# Patient Record
Sex: FEMALE | Race: WHITE | NOT HISPANIC OR LATINO | Employment: FULL TIME | URBAN - METROPOLITAN AREA
[De-identification: names, ages, dates, MRNs, and addresses within clinical notes are randomized per-mention and may not be internally consistent; named-entity substitution may affect disease eponyms.]

---

## 2019-08-12 ENCOUNTER — APPOINTMENT (EMERGENCY)
Dept: RADIOLOGY | Facility: HOSPITAL | Age: 54
End: 2019-08-12
Payer: OTHER MISCELLANEOUS

## 2019-08-12 ENCOUNTER — HOSPITAL ENCOUNTER (EMERGENCY)
Facility: HOSPITAL | Age: 54
Discharge: HOME | End: 2019-08-12
Attending: EMERGENCY MEDICINE | Admitting: EMERGENCY MEDICINE
Payer: OTHER MISCELLANEOUS

## 2019-08-12 VITALS
SYSTOLIC BLOOD PRESSURE: 116 MMHG | OXYGEN SATURATION: 98 % | WEIGHT: 140 LBS | HEART RATE: 76 BPM | RESPIRATION RATE: 16 BRPM | TEMPERATURE: 98 F | DIASTOLIC BLOOD PRESSURE: 71 MMHG

## 2019-08-12 DIAGNOSIS — S82.841A ANKLE FRACTURE, BIMALLEOLAR, CLOSED, RIGHT, INITIAL ENCOUNTER: Primary | ICD-10-CM

## 2019-08-12 PROCEDURE — 72220 X-RAY EXAM SACRUM TAILBONE: CPT

## 2019-08-12 PROCEDURE — 73610 X-RAY EXAM OF ANKLE: CPT

## 2019-08-12 PROCEDURE — 96375 TX/PRO/DX INJ NEW DRUG ADDON: CPT

## 2019-08-12 PROCEDURE — 96376 TX/PRO/DX INJ SAME DRUG ADON: CPT

## 2019-08-12 PROCEDURE — 96361 HYDRATE IV INFUSION ADD-ON: CPT

## 2019-08-12 PROCEDURE — 99285 EMERGENCY DEPT VISIT HI MDM: CPT | Performed by: EMERGENCY MEDICINE

## 2019-08-12 PROCEDURE — 73590 X-RAY EXAM OF LOWER LEG: CPT

## 2019-08-12 PROCEDURE — NS001 PR NO SIGNATURE OR ATTESTATION: Performed by: ORTHOPAEDIC SURGERY

## 2019-08-12 PROCEDURE — 27810 TREATMENT OF ANKLE FRACTURE: CPT | Performed by: EMERGENCY MEDICINE

## 2019-08-12 PROCEDURE — 99284 EMERGENCY DEPT VISIT MOD MDM: CPT

## 2019-08-12 PROCEDURE — 73600 X-RAY EXAM OF ANKLE: CPT

## 2019-08-12 PROCEDURE — 96374 THER/PROPH/DIAG INJ IV PUSH: CPT

## 2019-08-12 RX ORDER — KETAMINE HCL IN NACL, ISO-OSM 100MG/10ML
2 SYRINGE (ML) INJECTION ONCE
Status: COMPLETED | OUTPATIENT
Start: 2019-08-12 | End: 2019-08-12

## 2019-08-12 RX ORDER — LIDOCAINE HYDROCHLORIDE 10 MG/ML
30 INJECTION, SOLUTION EPIDURAL; INFILTRATION; INTRACAUDAL; PERINEURAL ONCE
Status: COMPLETED | OUTPATIENT
Start: 2019-08-12 | End: 2019-08-12

## 2019-08-12 RX ORDER — FENTANYL CITRATE 50 UG/ML
2 INJECTION, SOLUTION INTRAMUSCULAR; INTRAVENOUS ONCE
Status: COMPLETED | OUTPATIENT
Start: 2019-08-12 | End: 2019-08-12

## 2019-08-12 RX ORDER — OXYCODONE HYDROCHLORIDE AND ACETAMINOPHEN 5; 325 MG/1; MG/1
1 TABLET ORAL EVERY 4 HOURS PRN
Qty: 5 TABLET | Refills: 0 | Status: SHIPPED | OUTPATIENT
Start: 2019-08-12 | End: 2019-08-22

## 2019-08-12 RX ORDER — HYDROMORPHONE HCL/PF 1 MG/ML
1 SYRINGE (ML) INJECTION ONCE
Status: COMPLETED | OUTPATIENT
Start: 2019-08-12 | End: 2019-08-12

## 2019-08-12 RX ORDER — FENTANYL CITRATE 50 UG/ML
50 INJECTION, SOLUTION INTRAMUSCULAR; INTRAVENOUS ONCE
Status: COMPLETED | OUTPATIENT
Start: 2019-08-12 | End: 2019-08-12

## 2019-08-12 RX ADMIN — SODIUM CHLORIDE 1000 ML: 0.9 INJECTION, SOLUTION INTRAVENOUS at 04:08

## 2019-08-12 RX ADMIN — FENTANYL CITRATE 50 MCG: 50 INJECTION, SOLUTION INTRAMUSCULAR; INTRAVENOUS at 08:00

## 2019-08-12 RX ADMIN — HYDROMORPHONE HYDROCHLORIDE 1 MG: 1 INJECTION, SOLUTION INTRAMUSCULAR; INTRAVENOUS; SUBCUTANEOUS at 08:36

## 2019-08-12 RX ADMIN — HYDROMORPHONE HYDROCHLORIDE 1 MG: 1 INJECTION, SOLUTION INTRAMUSCULAR; INTRAVENOUS; SUBCUTANEOUS at 04:09

## 2019-08-12 RX ADMIN — LIDOCAINE HYDROCHLORIDE 30 ML: 10 INJECTION, SOLUTION EPIDURAL; INFILTRATION; INTRACAUDAL; PERINEURAL at 08:19

## 2019-08-12 RX ADMIN — Medication 65 MG: at 05:43

## 2019-08-12 NOTE — ED PROVIDER NOTES
History  Chief Complaint   Patient presents with    Fall     pt fell tonight at work per ems      45-year-old woman presents for evaluation of right ankle pain  Patient works on train cars  She was attempting to climb a ladder a car this evening when she slipped  She states she fell onto her tailbone and right leg  She is complaining exquisite right ankle pain  She was unable to bear weight and had to drag herself to find help  EMS was called to the scene  Patient required multiple doses of fentanyl  She received 200 mcg total EN route  Patient complains of pain in her coccyx as well  It is mild compared to her ankle pain  She denies hitting her head, LOC  She remembers all the events  She denies dizziness, nausea, vomiting, chest pain, neck pain, back pain, abdominal pain  She denies blood thinners  None       History reviewed  No pertinent past medical history  History reviewed  No pertinent surgical history  History reviewed  No pertinent family history  I have reviewed and agree with the history as documented  Social History     Tobacco Use    Smoking status: Never Smoker    Smokeless tobacco: Never Used   Substance Use Topics    Alcohol use: Not Currently    Drug use: Never        Review of Systems   Constitutional: Negative for appetite change, chills, diaphoresis, fatigue and fever  HENT: Negative for congestion, rhinorrhea and sore throat  Respiratory: Negative for cough, shortness of breath, wheezing and stridor  Cardiovascular: Negative for chest pain, palpitations and leg swelling  Gastrointestinal: Negative for abdominal distention, abdominal pain, constipation, diarrhea, nausea and vomiting  Endocrine: Negative for polydipsia and polyuria  Genitourinary: Positive for pelvic pain  Negative for dysuria and hematuria  Musculoskeletal: Positive for arthralgias  Negative for back pain, neck pain and neck stiffness          Right ankle   Skin: Negative for pallor, rash and wound  Neurological: Negative for dizziness, light-headedness and headaches  Psychiatric/Behavioral: Negative for behavioral problems and confusion  Physical Exam  ED Triage Vitals   Temperature Pulse Respirations Blood Pressure SpO2   08/12/19 0349 08/12/19 0349 08/12/19 0349 08/12/19 0349 08/12/19 0349   98 °F (36 7 °C) 81 18 129/75 98 %      Temp Source Heart Rate Source Patient Position - Orthostatic VS BP Location FiO2 (%)   08/12/19 0349 08/12/19 0349 08/12/19 0715 08/12/19 0715 --   Oral Monitor Lying Right arm       Pain Score       08/12/19 0559       No Pain             Orthostatic Vital Signs  Vitals:    08/12/19 0830 08/12/19 0900 08/12/19 0930 08/12/19 1030   BP: 104/71 106/59 105/62 116/71   Pulse: 92 72 74 76   Patient Position - Orthostatic VS: Lying Lying Lying Lying       Physical Exam   Constitutional: She is oriented to person, place, and time  She appears well-developed and well-nourished  No distress  HENT:   Head: Normocephalic and atraumatic  Eyes: Conjunctivae are normal  No scleral icterus  Neck: Normal range of motion  Neck supple  Cardiovascular: Normal rate, regular rhythm, normal heart sounds and intact distal pulses  No murmur heard  Pulmonary/Chest: Effort normal and breath sounds normal  No respiratory distress  She has no wheezes  Abdominal: Soft  Bowel sounds are normal  She exhibits no distension  There is no tenderness  Musculoskeletal: Normal range of motion  She exhibits tenderness and deformity  She exhibits no edema  Legs:  No proximal fibular, calf tenderness  Neurological: She is alert and oriented to person, place, and time  No midline C, T, L-spine tenderness to palpation  No step-offs, deformities   Skin: Skin is warm and dry  No rash noted  She is not diaphoretic  No erythema  No pallor  Psychiatric: She has a normal mood and affect  Her behavior is normal    Nursing note and vitals reviewed        ED Medications  Medications   fentanyl citrate (PF) (FOR EMS ONLY) 100 mcg/2 mL injection 200 mcg (0 mcg Does not apply Given to EMS 8/12/19 0410)   HYDROmorphone (DILAUDID) injection 1 mg (1 mg Intravenous Given 8/12/19 0409)   sodium chloride 0 9 % bolus 1,000 mL (0 mL Intravenous Stopped 8/12/19 0516)   Ketamine HCl 127 mg (65 mg Intravenous Given 8/12/19 0543)   fentanyl citrate (PF) 100 MCG/2ML 50 mcg (50 mcg Intravenous Given 8/12/19 0800)   lidocaine (PF) (XYLOCAINE-MPF) 1 % injection 30 mL (30 mL Infiltration Given by Other 8/12/19 0819)   HYDROmorphone (DILAUDID) injection 1 mg (1 mg Intravenous Given 8/12/19 0836)       Diagnostic Studies  Results Reviewed     None                 XR ankle 3+ vw right   Final Result by Stephanie Tse MD (08/12 5950)      Improved alignment of fractures of the distal right tibia and fibula, the ankle mortise and the talus following closed reduction  Workstation performed: [id number]         XR ankle 2 views RIGHT   Final Result by Stephanie Tse MD (13/53 5227)      Improved alignment of fractures of the distal right tibia and fibula, ankle mortise and talus following closed reduction  Workstation performed: [id number]         XR tibia fibula 2 views RIGHT   Final Result by Stephanie Tse MD (08/12 7428)      No acute osseous abnormality  Workstation performed: [id number]         XR sacrum and coccyx   Final Result by Stephanie Tse MD (08/12 6024)      No fracture  Workstation performed: [id number]         XR ankle 3+ views RIGHT   Final Result by Stephanie Tse MD (08/12 7091)      Bimalleolar fracture with disruption of the mortise and lateral talar subluxation              Workstation performed: [id number]               Procedures  Orthopedic injury treatment  Date/Time: 8/12/2019 5:53 AM  Performed by: Faizan Conde MD  Authorized by: Faizan Conde MD     Patient Location:  ED  Other Assisting Provider: Yes (comment) (Dr Resendez Agent)    Verbal consent obtained?: Yes    Written consent obtained?: Yes    Risks and benefits: Risks, benefits and alternatives were discussed    Consent given by:  Patient  Patient states understanding of procedure being performed: Yes    Patient's understanding of procedure matches consent: Yes    Procedure consent matches procedure scheduled: Yes    Patient identity confirmed:  Verbally with patient and arm band  Injury location:  Lower leg  Location details:  Right lower leg  Injury type:  Fracture-dislocation (right bi malleolar)  Neurovascular status: Neurovascularly intact    Distal perfusion: normal    Neurological function: normal    Range of motion: reduced    Sedation type:   Moderate (conscious) sedation (See separate Procedural Sedation form)  Manipulation performed?: Yes    Skin traction used?: Yes    Skeletal traction used?: Yes    Reduction successful?: Yes    Confirmation: Reduction confirmed by x-ray    Immobilization:  Splint  Splint type:  Short leg  Supplies used:  Ortho-Glass  Neurovascular status: Neurovascularly intact    Distal perfusion: normal    Neurological function: normal    Range of motion: unchanged    Patient tolerance:  Patient tolerated the procedure well with no immediate complications    Procedural Sedation  Date/Time: 8/15/2019 3:21 PM  Performed by: Kristi Lala MD  Authorized by: Kristi Lala MD     Procedure details (see MAR for exact dosages):     Sedation start time:  8/12/2019 5:30 AM    Preoxygenation:  Nasal cannula    Sedation:  Ketamine    Intra-procedure monitoring:  Blood pressure monitoring, continuous capnometry, cardiac monitor, continuous pulse oximetry, frequent LOC assessments and frequent vital sign checks    Intra-procedure events: none      Sedation end time:  8/12/2019 6:30 AM    Total sedation time (minutes):  60  Post-procedure details:     Post-sedation assessment completed:  8/12/2019 6:35 AM    Attendance: Gabriel Montalvo attendance by certified staff until patient recovered      Recovery: Patient returned to pre-procedure baseline      Post-sedation assessments completed and reviewed: airway patency, cardiovascular function, hydration status, mental status, nausea/vomiting, respiratory function and temperature      Patient is stable for discharge or admission: yes      Patient tolerance: Tolerated well, no immediate complications      Conscious Sedation Assessment      Classification Score   ASA Scale Assessment  1-Healthy patient, no disease outside surgical process filed at 08/12/2019 0525            ED Course                               MDM  Number of Diagnoses or Management Options  Ankle fracture, bimalleolar, closed, right, initial encounter: new and requires workup  Diagnosis management comments: 71-year-old woman presents with right ankle pain following a fall  The right ankle is obviously deformed, dentist   Patient is neurovascularly intact on initial evaluation  Will check x-rays the sacrum, right knee - foot  Analgesia  X-ray shows bimalleolar fracture right ankle  Will attempt reduction and splint  Will give ketamine for procedural sedation  Discussed with Orthopedics on-call  They will review post reduction films and give further recommendations         Amount and/or Complexity of Data Reviewed  Clinical lab tests: ordered and reviewed  Tests in the radiology section of CPT®: ordered and reviewed  Decide to obtain previous medical records or to obtain history from someone other than the patient: yes  Obtain history from someone other than the patient: yes  Review and summarize past medical records: yes  Discuss the patient with other providers: yes  Independent visualization of images, tracings, or specimens: yes    Risk of Complications, Morbidity, and/or Mortality  Presenting problems: low  Diagnostic procedures: minimal  Management options: minimal    Patient Progress  Patient progress: stable      Disposition  Final diagnoses: Ankle fracture, bimalleolar, closed, right, initial encounter     Time reflects when diagnosis was documented in both MDM as applicable and the Disposition within this note     Time User Action Codes Description Comment    8/12/2019  6:06 AM Fuad Joaquin [O70 225Y] Ankle fracture, bimalleolar, closed, right, initial encounter       ED Disposition     ED Disposition Condition Date/Time Comment    Discharge Stable Mon Aug 12, 2019  9:24 AM Coleman Lyons discharge to home/self care  Follow-up Information     Follow up With Specialties Details Why Contact Info Additional Information    Florian Barba MD Orthopedic Surgery Schedule an appointment as soon as possible for a visit in 1 week For wound re-check [address] [place] [address]  Emergency Department Emergency Medicine  If symptoms worsen [address] [place] [address], [address] [place] [address] MD [facility]   [address] [facility] [address] [place] [phone]             Discharge Medication List as of 8/12/2019  9:42 AM      START taking these medications    Details   oxyCODONE-acetaminophen (PERCOCET) 5-325 mg per tablet Take 1 tablet by mouth every 4 (four) hours as needed for moderate pain for up to 10 daysMax Daily Amount: 6 tablets, Starting Mon 8/12/2019, Until Thu 8/22/2019, Print           No discharge procedures on file  ED Provider  Attending physically available and evaluated Coleman Lyons I managed the patient along with the ED Attending      Electronically Signed by         Madi Chavez MD  08/15/19 6575

## 2019-08-12 NOTE — ED NOTES
Pt reports decrease in pain following med admin   Pt resting comfortably     [place]  08/12/19 8183

## 2019-08-12 NOTE — CONSULTS
Orthopedics   Fabrice David 49 y o  female MRN: [id number]  Unit/Bed#: X ray      Chief Complaint:   right ankle pain    HPI:  49 y  o female status post fall from height complaining of right ankle pain and inability to bear weight  Patient was climbing on a railroad cart when she fell off, landing her right ankle  She immediately felt pain and was unable to bear weight  Denies any tingling or numbness in the extremity  She has not had any issues with this ankle in the past, no major PMH, community ambulator at baseline  Review Of Systems:   · Skin: Normal  · Neuro: See HPI  · Musculoskeletal: See HPI  · 14 point review of systems negative except as stated above     Past Medical History:   History reviewed  No pertinent past medical history  Past Surgical History:   History reviewed  No pertinent surgical history  Family History:  Family history reviewed and non-contributory  History reviewed  No pertinent family history      Social History:  Social History     Socioeconomic History    Marital status: Unknown     Spouse name: None    Number of children: None    Years of education: None    Highest education level: None   Occupational History    None   Social Needs    Financial resource strain: None    Food insecurity:     Worry: None     Inability: None    Transportation needs:     Medical: None     Non-medical: None   Tobacco Use    Smoking status: Never Smoker    Smokeless tobacco: Never Used   Substance and Sexual Activity    Alcohol use: None    Drug use: None    Sexual activity: None   Lifestyle    Physical activity:     Days per week: None     Minutes per session: None    Stress: None   Relationships    Social connections:     Talks on phone: None     Gets together: None     Attends Zoroastrianism service: None     Active member of club or organization: None     Attends meetings of clubs or organizations: None     Relationship status: None    Intimate partner violence:     Fear of current or ex partner: None     Emotionally abused: None     Physically abused: None     Forced sexual activity: None   Other Topics Concern    None   Social History Narrative    None       Allergies:   No Known Allergies        Labs:  No results found for: HCT, HGB, PT, INR, WBC, ESR, CRP    Meds:  No current facility-administered medications for this encounter  No current outpatient medications on file  Blood Culture:   No results found for: BLOODCX    Wound Culture:   No results found for: WOUNDCULT    Ins and Outs:  I/O last 24 hours: In: 1000 [IV Piggyback:1000]  Out: -           Physical Exam:   /69 (BP Location: Right arm)   Pulse 72   Temp 98 °F (36 7 °C) (Oral)   Resp 16   Wt 63 5 kg (140 lb)   SpO2 100%   Gen: Alert and oriented to person, place, time  HEENT: EOMI, eyes clear, moist mucus membranes, hearing intact  Respiratory: Bilateral chest rise  No audible wheezing found  Cardiovascular: Regular Rate and Rhythm  Abdomen: soft nontender/nondistended  Musculoskeletal: right lower extremity  · Skin intact, moderate swelling and ecchymosis around the ankle  · Tender to palpation over medial and lateral malleoli  · Painful ankle range of motion  · Sensation intact DP/SP/Tib/Stefanie/Saph  · Positive knee flexion/extension, EHL/FHL  · Palpable DP pulse    Radiology:   I personally reviewed the films  X-rays right ankle shows cale ankle fracture    Procedure- Orthopedics   Rick Betocarlos 49 y o  female MRN: [id number]  Unit/Bed#: X ray    Procedure: Ankle reduction and splint application    A hematoma block was given with 20cc on 1% lidocaine without epi  Once adequate anesthesia was obtained a gentle closed reduction maneuver was performed and pt was placed in a well padded AO splint  Pt tolerated the procedure well and was neurovascularly intact both pre and post procedure   Post reduction orthogonal x rays showed appropriate reduction of the talus in the ankle vishal     _*_*_*_*_*_*_*_*_*_*_*_*_*_*_*_*_*_*_*_*_*_*_*_*_*_*_*_*_*_*_*_*_*_*_*_*_*_*_*_*_*    Assessment:  49 y  o female status post fall from height with right ankle fracture  Patient is ok to be dc from the ED, and will need fu with ortho as an outpatient to schedule elective ORIF of the right ankle       Plan:   · NWB right lower extremity in AO splint  · Ok for DC from ED with instructions to f/u with ortho in 1 week  · Pain meds per ED  · Instructed to return to ED if pt experiences new numbness or tingling, pain that is uncontrollable with oral pain meds, or if toes become cold and pale    Danica Rowe MD

## 2019-08-12 NOTE — ED RE-EVALUATION NOTE
I re-evaluated the patient  She is in no acute distress and is normal mental status  She reports improvement in her pain after 2nd reduction, perform by Orthopedics  Repeat imaging shows improvement in alignment  I reviewed her medications and she takes vitamins and escitalopram   Reviewed pain management with naproxen and acetaminophen, and follow-up  Prescribed her 5 Percocet for breakthrough pain       Miquel Jasmine DO  08/12/19 4216

## 2019-08-12 NOTE — ED ATTENDING ATTESTATION
Hodan Rojas MD, saw and evaluated the patient  I have discussed the patient with the resident/non-physician practitioner and agree with the resident's/non-physician practitioner's findings, Plan of Care, and MDM as documented in the resident's/non-physician practitioner's note, except where noted  All available labs and Radiology studies were reviewed  At this point I agree with the current assessment done in the Emergency Department  I have conducted an independent evaluation of this patient including a focused history of:    Emergency Department Note- Shannan Hayes 49 y o  female MRN: [id number]    Unit/Bed#: ED 01 Encounter: [id number]    Shannan Hayes is a 49 y o  female who presents with   Chief Complaint   Patient presents with   Amena Lito Fall     pt fell tonight at work per ems          History of Present Illness   HPI:  Shannan Hayes is a 49 y o  female who presents for evaluation of:  Fall off the ladder of a rail car  Patient notes R lower leg pain and tailbone pain  Patient denies head trauma, back pain, and neck pain  Review of Systems   Constitutional: Positive for chills  Negative for fever  Cardiovascular: Negative for chest pain and palpitations  Gastrointestinal: Negative for abdominal pain and nausea  Musculoskeletal: Negative for back pain and neck pain  Neurological: Negative for syncope and headaches  All other systems reviewed and are negative  Historical Information   History reviewed  No pertinent past medical history  History reviewed  No pertinent surgical history    Social History   Social History     Substance and Sexual Activity   Alcohol Use Not on file     Social History     Substance and Sexual Activity   Drug Use Not on file     Social History     Tobacco Use   Smoking Status Never Smoker   Smokeless Tobacco Never Used     Family History: non-contributory    Meds/Allergies   all medications and allergies reviewed  No Known Allergies    Objective First Vitals:   Blood Pressure: 129/75 (19)  Pulse: 81 (19)  Temperature: 98 °F (36 7 °C) (19)  Temp Source: Oral (19)  Respirations: 18 (19)  Weight - Scale: 63 5 kg (140 lb) (19)  SpO2: 98 % (19)    Current Vitals:   Blood Pressure: 129/75 (19)  Pulse: 81 (19)  Temperature: 98 °F (36 7 °C) (19)  Temp Source: Oral (19)  Respirations: 18 (19)  Weight - Scale: 63 5 kg (140 lb) (19)  SpO2: 98 % (19)    No intake or output data in the 24 hours ending 19    Invasive Devices     None                 Physical Exam   Constitutional: She is oriented to person, place, and time  She appears well-developed  No distress  HENT:   Head: Normocephalic and atraumatic  Neck: Normal range of motion  Neck supple  Abdominal: Soft  Bowel sounds are normal    Musculoskeletal: She exhibits tenderness (RLE ) and deformity (RLE)  Neurological: She is alert and oriented to person, place, and time  Skin: Skin is warm and dry  Capillary refill takes less than 2 seconds  Psychiatric: She has a normal mood and affect  Her behavior is normal  Judgment and thought content normal    Nursing note and vitals reviewed  Medical Decision Makin  Acute RLE pain: xray r/o fx  2  Coccygeal pain: xray r/o fx    No results found for this or any previous visit (from the past 36 hour(s))  XR ankle 3+ views RIGHT    (Results Pending)   XR tibia fibula 2 views RIGHT    (Results Pending)   XR knee 1 or 2 views right    (Results Pending)   XR pelvis complete 3+ views    (Results Pending)   XR sacrum and coccyx    (Results Pending)         Portions of the record may have been created with voice recognition software  Occasional wrong word or "sound a like" substitutions may have occurred due to the inherent limitations of voice recognition software    Read the chart carefully and recognize, using context, where substitutions have occurred

## 2019-08-14 ENCOUNTER — HOSPITAL ENCOUNTER (EMERGENCY)
Facility: HOSPITAL | Age: 54
Discharge: HOME | End: 2019-08-14
Attending: EMERGENCY MEDICINE
Payer: OTHER MISCELLANEOUS

## 2019-08-14 ENCOUNTER — APPOINTMENT (EMERGENCY)
Dept: ULTRASOUND IMAGING | Facility: HOSPITAL | Age: 54
End: 2019-08-14
Payer: OTHER MISCELLANEOUS

## 2019-08-14 VITALS
WEIGHT: 130 LBS | OXYGEN SATURATION: 100 % | RESPIRATION RATE: 18 BRPM | HEART RATE: 84 BPM | DIASTOLIC BLOOD PRESSURE: 69 MMHG | SYSTOLIC BLOOD PRESSURE: 129 MMHG | TEMPERATURE: 98.6 F

## 2019-08-14 DIAGNOSIS — S82.899A ANKLE FRACTURE: Primary | ICD-10-CM

## 2019-08-14 PROCEDURE — 99283 EMERGENCY DEPT VISIT LOW MDM: CPT

## 2019-08-14 PROCEDURE — 93971 EXTREMITY STUDY: CPT | Performed by: SURGERY

## 2019-08-14 PROCEDURE — 99282 EMERGENCY DEPT VISIT SF MDM: CPT | Performed by: EMERGENCY MEDICINE

## 2019-08-14 PROCEDURE — 93971 EXTREMITY STUDY: CPT

## 2019-08-14 RX ORDER — OXYCODONE HYDROCHLORIDE AND ACETAMINOPHEN 5; 325 MG/1; MG/1
1 TABLET ORAL EVERY 6 HOURS PRN
Qty: 20 TABLET | Refills: 0 | Status: SHIPPED | OUTPATIENT
Start: 2019-08-14

## 2019-08-14 NOTE — ED PROVIDER NOTES
History  CC: leg pain  HPI  48 yo F presents with leg swelling to right leg  She has splint in place after sustaining bimalleolar fracture to ankle two days ago  Has been taking oxycodone which has been helping the pain to some degree  She states she has been following up with [facility] the [facility] who recommended she get an [address] done at the ED to rule out DVT  She has surgery scheduled for a week from today  Prior to Admission Medications   Prescriptions Last Dose Informant Patient Reported? Taking?   oxyCODONE-acetaminophen (PERCOCET) 5-325 mg per tablet   No No   Sig: Take 1 tablet by mouth every 4 (four) hours as needed for moderate pain for up to 10 daysMax Daily Amount: 6 tablets      Facility-Administered Medications: None       History reviewed  No pertinent past medical history  History reviewed  No pertinent surgical history  History reviewed  No pertinent family history  I have reviewed and agree with the history as documented  Social History     Tobacco Use    Smoking status: Never Smoker    Smokeless tobacco: Never Used   Substance Use Topics    Alcohol use: Not Currently    Drug use: Never        Review of Systems   Constitutional: Negative for chills and fever  HENT: Negative for dental problem and ear pain  Eyes: Negative for pain and redness  Respiratory: Negative for cough and shortness of breath  Cardiovascular: Negative for chest pain and palpitations  Gastrointestinal: Negative for abdominal pain and nausea  Endocrine: Negative for polydipsia and polyphagia  Genitourinary: Negative for dysuria and frequency  Musculoskeletal: Positive for arthralgias  Negative for joint swelling  Skin: Negative for color change and rash  Neurological: Negative for dizziness and headaches  Psychiatric/Behavioral: Negative for behavioral problems and confusion  All other systems reviewed and are negative        Physical Exam  Physical Exam   Constitutional: She is oriented to person, place, and time  She appears well-developed and well-nourished  No distress  HENT:   Head: Atraumatic  Right Ear: External ear normal    Left Ear: External ear normal    Nose: Nose normal    Eyes: Pupils are equal, round, and reactive to light  Conjunctivae and EOM are normal    Neck: Normal range of motion  Neck supple  No JVD present  Cardiovascular: Normal rate, regular rhythm and normal heart sounds  No murmur heard  Pulmonary/Chest: Effort normal and breath sounds normal  No respiratory distress  She has no wheezes  Abdominal: Soft  Bowel sounds are normal  She exhibits no distension  There is no tenderness  Musculoskeletal: Normal range of motion  She exhibits no edema  Splint in place RLE, pulses intact and normal sensation and movement of toes, compartments soft   Neurological: She is alert and oriented to person, place, and time  No cranial nerve deficit  Skin: Skin is warm and dry  Capillary refill takes less than 2 seconds  She is not diaphoretic  Psychiatric: She has a normal mood and affect  Her behavior is normal    Nursing note and vitals reviewed  Vital Signs  ED Triage Vitals [08/14/19 1624]   Temperature Pulse Respirations Blood Pressure SpO2   98 6 °F (37 °C) 84 18 129/69 100 %      Temp Source Heart Rate Source Patient Position - Orthostatic VS BP Location FiO2 (%)   Oral Monitor Lying Right arm --      Pain Score       5           Vitals:    08/14/19 1624   BP: 129/69   Pulse: 84   Patient Position - Orthostatic VS: Lying         Visual Acuity      ED Medications  Medications - No data to display    Diagnostic Studies  Results Reviewed     None                 VAS lower limb venous duplex study, unilateral/limited    (Results Pending)              Procedures  Procedures       ED Course                               MDM  50 yo F presents with pain/swelling from ankle fracture, compartments soft, low suspicion for compartment syndrome, DVT study negative   She has surgery scheduled in a week and has run out of pain medication, will give a short course of percocet for breakthrough pain and surgery in one week is scheduled  Disposition  Final diagnoses: Ankle fracture     Time reflects when diagnosis was documented in both MDM as applicable and the Disposition within this note     Time User Action Codes Description Comment    8/14/2019  4:55 PM Delta Melendez Add [P02 225Q] Ankle fracture       ED Disposition     ED Disposition Condition Date/Time Comment    Discharge Stable Wed Aug 14, 2019  4:55 PM Coleman Serena discharge to home/self care  Follow-up Information    None         Discharge Medication List as of 8/14/2019  4:57 PM      START taking these medications    Details   !! oxyCODONE-acetaminophen (PERCOCET) 5-325 mg per tablet Take 1 tablet by mouth every 6 (six) hours as needed for moderate painMax Daily Amount: 4 tablets, Starting Wed 8/14/2019, Print       !! - Potential duplicate medications found  Please discuss with provider  CONTINUE these medications which have NOT CHANGED    Details   !! oxyCODONE-acetaminophen (PERCOCET) 5-325 mg per tablet Take 1 tablet by mouth every 4 (four) hours as needed for moderate pain for up to 10 daysMax Daily Amount: 6 tablets, Starting Mon 8/12/2019, Until Thu 8/22/2019, Print       !! - Potential duplicate medications found  Please discuss with provider  No discharge procedures on file      ED Provider  Electronically Signed by           Rigo Sheehan MD  08/14/19 7886

## 2019-08-16 ENCOUNTER — TRANSCRIBE ORDERS (OUTPATIENT)
Dept: ADMINISTRATIVE | Facility: HOSPITAL | Age: 54
End: 2019-08-16

## 2019-08-16 ENCOUNTER — HOSPITAL ENCOUNTER (OUTPATIENT)
Dept: CT IMAGING | Facility: HOSPITAL | Age: 54
Discharge: HOME | End: 2019-08-16
Payer: OTHER MISCELLANEOUS

## 2019-08-16 ENCOUNTER — HOSPITAL ENCOUNTER (EMERGENCY)
Facility: HOSPITAL | Age: 54
Discharge: HOME | End: 2019-08-16
Attending: EMERGENCY MEDICINE | Admitting: EMERGENCY MEDICINE
Payer: OTHER MISCELLANEOUS

## 2019-08-16 ENCOUNTER — TRANSCRIBE ORDERS (OUTPATIENT)
Dept: LAB | Facility: CLINIC | Age: 54
End: 2019-08-16

## 2019-08-16 ENCOUNTER — APPOINTMENT (OUTPATIENT)
Dept: LAB | Facility: CLINIC | Age: 54
End: 2019-08-16
Payer: OTHER MISCELLANEOUS

## 2019-08-16 VITALS
HEART RATE: 72 BPM | HEIGHT: 64 IN | DIASTOLIC BLOOD PRESSURE: 57 MMHG | OXYGEN SATURATION: 98 % | SYSTOLIC BLOOD PRESSURE: 104 MMHG | WEIGHT: 130 LBS | RESPIRATION RATE: 16 BRPM | BODY MASS INDEX: 22.2 KG/M2 | TEMPERATURE: 98.6 F

## 2019-08-16 DIAGNOSIS — D64.9 ANEMIA, UNSPECIFIED TYPE: ICD-10-CM

## 2019-08-16 DIAGNOSIS — R58 BLOOD LOSS: Primary | ICD-10-CM

## 2019-08-16 DIAGNOSIS — S32.2XXA SACRUM AND COCCYX FRACTURE (HCC): Primary | ICD-10-CM

## 2019-08-16 DIAGNOSIS — R58 BLOOD LOSS: ICD-10-CM

## 2019-08-16 DIAGNOSIS — S32.10XA SACRUM AND COCCYX FRACTURE (HCC): Primary | ICD-10-CM

## 2019-08-16 DIAGNOSIS — D64.9 ANEMIA: ICD-10-CM

## 2019-08-16 DIAGNOSIS — R10.9 ABDOMINAL PAIN, UNSPECIFIED ABDOMINAL LOCATION: ICD-10-CM

## 2019-08-16 DIAGNOSIS — D64.9 ANEMIA, UNSPECIFIED TYPE: Primary | ICD-10-CM

## 2019-08-16 DIAGNOSIS — S82.841A ANKLE FRACTURE, BIMALLEOLAR, CLOSED, RIGHT, INITIAL ENCOUNTER: ICD-10-CM

## 2019-08-16 LAB
ABO GROUP BLD: NORMAL
ANION GAP SERPL CALCULATED.3IONS-SCNC: 11 MMOL/L (ref 4–13)
BASOPHILS # BLD AUTO: 0.02 THOUSANDS/ΜL (ref 0–0.1)
BASOPHILS # BLD AUTO: 0.03 THOUSANDS/ΜL (ref 0–0.1)
BASOPHILS NFR BLD AUTO: 1 % (ref 0–1)
BASOPHILS NFR BLD AUTO: 1 % (ref 0–1)
BLD GP AB SCN SERPL QL: NEGATIVE
BUN SERPL-MCNC: 15 MG/DL (ref 5–25)
CALCIUM SERPL-MCNC: 8.6 MG/DL (ref 8.3–10.1)
CHLORIDE SERPL-SCNC: 103 MMOL/L (ref 100–108)
CO2 SERPL-SCNC: 28 MMOL/L (ref 21–32)
CREAT SERPL-MCNC: 0.83 MG/DL (ref 0.6–1.3)
EOSINOPHIL # BLD AUTO: 0.06 THOUSAND/ΜL (ref 0–0.61)
EOSINOPHIL # BLD AUTO: 0.07 THOUSAND/ΜL (ref 0–0.61)
EOSINOPHIL NFR BLD AUTO: 1 % (ref 0–6)
EOSINOPHIL NFR BLD AUTO: 2 % (ref 0–6)
ERYTHROCYTE [DISTWIDTH] IN BLOOD BY AUTOMATED COUNT: 12.2 % (ref 11.6–15.1)
ERYTHROCYTE [DISTWIDTH] IN BLOOD BY AUTOMATED COUNT: 12.4 % (ref 11.6–15.1)
GFR SERPL CREATININE-BSD FRML MDRD: 81 ML/MIN/1.73SQ M
GLUCOSE SERPL-MCNC: 113 MG/DL (ref 65–140)
HCT VFR BLD AUTO: 23.9 % (ref 34.8–46.1)
HCT VFR BLD AUTO: 25.9 % (ref 34.8–46.1)
HGB BLD-MCNC: 8.3 G/DL (ref 11.5–15.4)
HGB BLD-MCNC: 8.6 G/DL (ref 11.5–15.4)
IMM GRANULOCYTES # BLD AUTO: 0.01 THOUSAND/UL (ref 0–0.2)
IMM GRANULOCYTES # BLD AUTO: 0.02 THOUSAND/UL (ref 0–0.2)
IMM GRANULOCYTES NFR BLD AUTO: 0 % (ref 0–2)
IMM GRANULOCYTES NFR BLD AUTO: 1 % (ref 0–2)
INR PPP: 0.97 (ref 0.84–1.19)
LYMPHOCYTES # BLD AUTO: 1.41 THOUSANDS/ΜL (ref 0.6–4.47)
LYMPHOCYTES # BLD AUTO: 1.55 THOUSANDS/ΜL (ref 0.6–4.47)
LYMPHOCYTES NFR BLD AUTO: 36 % (ref 14–44)
LYMPHOCYTES NFR BLD AUTO: 36 % (ref 14–44)
MCH RBC QN AUTO: 32.3 PG (ref 26.8–34.3)
MCH RBC QN AUTO: 33.3 PG (ref 26.8–34.3)
MCHC RBC AUTO-ENTMCNC: 33.2 G/DL (ref 31.4–37.4)
MCHC RBC AUTO-ENTMCNC: 34.7 G/DL (ref 31.4–37.4)
MCV RBC AUTO: 96 FL (ref 82–98)
MCV RBC AUTO: 97 FL (ref 82–98)
MONOCYTES # BLD AUTO: 0.35 THOUSAND/ΜL (ref 0.17–1.22)
MONOCYTES # BLD AUTO: 0.39 THOUSAND/ΜL (ref 0.17–1.22)
MONOCYTES NFR BLD AUTO: 9 % (ref 4–12)
MONOCYTES NFR BLD AUTO: 9 % (ref 4–12)
NEUTROPHILS # BLD AUTO: 2.07 THOUSANDS/ΜL (ref 1.85–7.62)
NEUTROPHILS # BLD AUTO: 2.25 THOUSANDS/ΜL (ref 1.85–7.62)
NEUTS SEG NFR BLD AUTO: 52 % (ref 43–75)
NEUTS SEG NFR BLD AUTO: 52 % (ref 43–75)
NRBC BLD AUTO-RTO: 0 /100 WBCS
NRBC BLD AUTO-RTO: 0 /100 WBCS
PLATELET # BLD AUTO: 139 THOUSANDS/UL (ref 149–390)
PLATELET # BLD AUTO: 144 THOUSANDS/UL (ref 149–390)
PMV BLD AUTO: 8.7 FL (ref 8.9–12.7)
PMV BLD AUTO: 8.8 FL (ref 8.9–12.7)
POTASSIUM SERPL-SCNC: 3.4 MMOL/L (ref 3.5–5.3)
PROTHROMBIN TIME: 12.3 SECONDS (ref 11.6–14.5)
RBC # BLD AUTO: 2.49 MILLION/UL (ref 3.81–5.12)
RBC # BLD AUTO: 2.66 MILLION/UL (ref 3.81–5.12)
RH BLD: NEGATIVE
SODIUM SERPL-SCNC: 142 MMOL/L (ref 136–145)
SPECIMEN EXPIRATION DATE: NORMAL
WBC # BLD AUTO: 3.93 THOUSAND/UL (ref 4.31–10.16)
WBC # BLD AUTO: 4.3 THOUSAND/UL (ref 4.31–10.16)

## 2019-08-16 PROCEDURE — 99283 EMERGENCY DEPT VISIT LOW MDM: CPT

## 2019-08-16 PROCEDURE — 86850 RBC ANTIBODY SCREEN: CPT | Performed by: EMERGENCY MEDICINE

## 2019-08-16 PROCEDURE — 86900 BLOOD TYPING SEROLOGIC ABO: CPT | Performed by: EMERGENCY MEDICINE

## 2019-08-16 PROCEDURE — 80048 BASIC METABOLIC PNL TOTAL CA: CPT

## 2019-08-16 PROCEDURE — 36415 COLL VENOUS BLD VENIPUNCTURE: CPT

## 2019-08-16 PROCEDURE — 85610 PROTHROMBIN TIME: CPT

## 2019-08-16 PROCEDURE — 99285 EMERGENCY DEPT VISIT HI MDM: CPT | Performed by: EMERGENCY MEDICINE

## 2019-08-16 PROCEDURE — 85025 COMPLETE CBC W/AUTO DIFF WBC: CPT

## 2019-08-16 PROCEDURE — 74176 CT ABD & PELVIS W/O CONTRAST: CPT

## 2019-08-16 PROCEDURE — 86901 BLOOD TYPING SEROLOGIC RH(D): CPT | Performed by: EMERGENCY MEDICINE

## 2019-08-16 RX ORDER — ESCITALOPRAM OXALATE 10 MG/1
10 TABLET ORAL
COMMUNITY

## 2019-08-16 NOTE — DISCHARGE INSTRUCTIONS
No specific treatment is required for your sacral fractures  You may use ice and/or heat for discomfort  Please follow-up with a back specialist either through our Orthopedics Department or [place] [id number]  where you are already established as a patient  Purse String (Intermediate) Text: Given the location of the defect and the characteristics of the surrounding skin a purse string intermediate closure was deemed most appropriate.  Undermining was performed circumfirentially around the surgical defect.  A purse string suture was then placed and tightened.

## 2019-08-16 NOTE — ED PROVIDER NOTES
History  Chief Complaint   Patient presents with    Abnormal Lab     pt states has temporary cast on from injury was sent for repeat blood work blood count dropped was sentfor  MRI today was told to come to ER cause pt possibly may be bleeding due to spinal fracture     Patient is a 59-year-old female who presents to the emergency department having been referred in after having CT scan of the lower back today and blood work revealing anemia  She was seen in the [facility] emergency department 4 days ago after fall onto her right ankle and lower back  She was diagnosed with a right bimalleolar fracture, underwent reduction of this and has upcoming surgery scheduled through a provider with [facility] on August 21st   Imaging of the lower back did not reveal the presence of any fractures  She subsequently had an ED visit 2 days ago after experiencing increased right leg swelling and discomfort with concern for possible DVT  Duplex study was unremarkable and basic labs were performed which revealed anemia with hemoglobin of 8 3  Patient relates that she followed up with her PCP this morning to obtain medical clearance for scheduled surgery  Her primary care physician expressed concern that her hemoglobin dropped from 12 8 in mid July when she had testing around the time of a routine physical exam to the 8 3  With her ongoing low back discomfort and buttock discoloration CT scan was also ordered  Patient notes that immediately following the fall she did experience a lot of numbness in the buttock region  For the 1st couple of days at home she would feel pain shooting through this area upon position changes and especially with standing and returning to a seated position  She has been ambulating with crutches  She has not experienced any bowel or bladder incontinence or retention  She does not have any associated abdominal pain  The low back and buttock discomfort has been getting slightly better    She is not on any anticoagulant medications  Prior to Admission Medications   Prescriptions Last Dose Informant Patient Reported? Taking?   escitalopram (LEXAPRO) 10 mg tablet 8/15/2019 at Unknown time Self Yes Yes   Sig: Take 10 mg by mouth daily at bedtime   oxyCODONE-acetaminophen (PERCOCET) 5-325 mg per tablet 8/15/2019 at Unknown time  No Yes   Sig: Take 1 tablet by mouth every 4 (four) hours as needed for moderate pain for up to 10 daysMax Daily Amount: 6 tablets   oxyCODONE-acetaminophen (PERCOCET) 5-325 mg per tablet Not Taking at Unknown time  No No   Sig: Take 1 tablet by mouth every 6 (six) hours as needed for moderate painMax Daily Amount: 4 tablets   Patient not taking: Reported on 8/16/2019      Facility-Administered Medications: None       History reviewed  No pertinent past medical history  Past Surgical History:   Procedure Laterality Date    FRACTURE SURGERY         History reviewed  No pertinent family history  I have reviewed and agree with the history as documented  Social History     Tobacco Use    Smoking status: Never Smoker    Smokeless tobacco: Never Used   Substance Use Topics    Alcohol use: Not Currently    Drug use: Never        Review of Systems   All other systems reviewed and are negative  Physical Exam  Physical Exam   Constitutional: She is oriented to person, place, and time  She appears well-developed and well-nourished  HENT:   Head: Normocephalic  Eyes: Conjunctivae and EOM are normal    Cardiovascular: Normal rate and regular rhythm  Pulmonary/Chest: Effort normal and breath sounds normal    Abdominal: Soft  She exhibits no distension  There is no tenderness  Musculoskeletal:   Right lower leg is splinted  She is able to range her toes slightly and reports intact sensation in these  Capillary refill in each toe is less than 2 seconds    There is some ecchymosis appreciated of the posterior calf just above the splint extending to the mid thigh medially  There is mild swelling of this region  Patient with deep ecchymosis over both buttocks  This encompasses a larger surface area on the right along with very slight induration  There is no lumbar or sacral tenderness to palpation  No skin disruption  Neurological: She is alert and oriented to person, place, and time  Patient with good strength on bilateral hip flexion, left dorsi and plantar flexion  Skin: Skin is warm and dry  Psychiatric: She has a normal mood and affect  Her behavior is normal    Nursing note and vitals reviewed        Vital Signs  ED Triage Vitals   Temperature Pulse Respirations Blood Pressure SpO2   08/16/19 1652 08/16/19 1652 08/16/19 1652 08/16/19 1652 08/16/19 1652   98 6 °F (37 °C) 80 18 149/71 100 %      Temp src Heart Rate Source Patient Position - Orthostatic VS BP Location FiO2 (%)   -- 08/16/19 1808 08/16/19 1808 08/16/19 1808 --    Monitor Lying Left arm       Pain Score       08/16/19 1652       6           Vitals:    08/16/19 1652 08/16/19 1808 08/16/19 1855   BP: 149/71 118/68 104/57   Pulse: 80 69 72   Patient Position - Orthostatic VS:  Lying Lying         Visual Acuity      ED Medications  Medications - No data to display    Diagnostic Studies  Results Reviewed     Procedure Component Value Units Date/Time    Protime-INR [[id number]]  (Normal) Collected:  08/16/19 1825    Lab Status:  Final result Specimen:  Blood from Arm, Right Updated:  08/16/19 1839     Protime 12 3 seconds      INR 4 12    Basic metabolic panel [[id number]]  (Abnormal) Collected:  08/16/19 1816    Lab Status:  Final result Specimen:  Blood from Arm, Right Updated:  08/16/19 1835     Sodium 142 mmol/L      Potassium 3 4 mmol/L      Chloride 103 mmol/L      CO2 28 mmol/L      ANION GAP 11 mmol/L      BUN 15 mg/dL      Creatinine 0 83 mg/dL      Glucose 113 mg/dL      Calcium 8 6 mg/dL      eGFR 81 ml/min/1 73sq m     Narrative:       [place] guidelines for Chronic Kidney Disease (CKD):     Stage 1 with normal or high GFR (GFR > 90 mL/min/1 73 square meters)    Stage 2 Mild CKD (GFR = 60-89 mL/min/1 73 square meters)    Stage 3A Moderate CKD (GFR = 45-59 mL/min/1 73 square meters)    Stage 3B Moderate CKD (GFR = 30-44 mL/min/1 73 square meters)    Stage 4 Severe CKD (GFR = 15-29 mL/min/1 73 square meters)    Stage 5 End Stage CKD (GFR <15 mL/min/1 73 square meters)  Note: GFR calculation is accurate only with a steady state creatinine    CBC and differential [[id number]]  (Abnormal) Collected:  08/16/19 1816    Lab Status:  Final result Specimen:  Blood from Arm, Right Updated:  08/16/19 1827     WBC 3 93 Thousand/uL      RBC 2 49 Million/uL      Hemoglobin 8 3 g/dL      Hematocrit 23 9 %      MCV 96 fL      MCH 33 3 pg      MCHC 34 7 g/dL      RDW 12 2 %      MPV 8 8 fL      Platelets 012 Thousands/uL      nRBC 0 /100 WBCs      Neutrophils Relative 52 %      Immat GRANS % 0 %      Lymphocytes Relative 36 %      Monocytes Relative 9 %      Eosinophils Relative 2 %      Basophils Relative 1 %      Neutrophils Absolute 2 07 Thousands/µL      Immature Grans Absolute 0 01 Thousand/uL      Lymphocytes Absolute 1 41 Thousands/µL      Monocytes Absolute 0 35 Thousand/µL      Eosinophils Absolute 0 07 Thousand/µL      Basophils Absolute 0 02 Thousands/µL                  No orders to display              Procedures  Procedures       ED Course  ED Course as of Aug 16 2308   Fri Aug 16, 2019   1848   Case discussed with trauma-Dr Hale  Hemoglobin is stable from 2 days ago  Dropped from July attributed to hematomas and bleeding from trauma on August 12th  Patient has not been worsening symptomatically  He advised I speak with Orthopedics with regard to the sacral and coccyx fracture  If orthopedics desires inpatient evaluation patient can be transferred [facility] [place]  [facility]   I spoke with Dr Jodie Carrasco from Orthopedics    Patient would not require any surgical treatment for current fractures  Outpatient follow-up would be appropriate   With either Dr Daija Choudhary or 1 of the neurosurgeons  As patient has already established care with [facility] she may additionally be interested  in seeing a back specialist there  Patient and  updated on these conversations and disposition plan for discharge home  She is concerned that the drop in hemoglobin may affect her surgery date for the ankle fracture;  this decision will be rendered by her ankle specialist                                   MDM    Disposition  Final diagnoses:   Sacrum and coccyx fracture ([facility] [address] )   Anemia   Ankle fracture, bimalleolar, closed, right, initial encounter     Time reflects when diagnosis was documented in both MDM as applicable and the Disposition within this note     Time User Action Codes Description Comment    8/16/2019  7:24 PM Adia Sera A Add [S32 10XA] Sacral fracture, closed ([facility] [address] )     8/16/2019  7:25 PM Zarefes-SadiAdia lin Sera A Add [S32 10XA,  S32 2XXA] Sacrum and coccyx fracture ([facility] [address] )     8/16/2019  7:25 PM Zarefes-SadiAdia lin Sera A Modify [S32 10XA,  S32 2XXA] Sacrum and coccyx fracture ([facility] [address] )     8/16/2019  7:25 PM Zarefes-AltamontFaisalAdia Sera A Remove [S32 10XA] Sacral fracture, closed ([facility] [address] )     8/16/2019  7:25 PM Florence Sera A Add [D64 9] Anemia     8/16/2019  7:25 PM Adia Sera A Add [B67 731X] Ankle fracture, bimalleolar, closed, right, initial encounter       ED Disposition     ED Disposition Condition Date/Time Comment    Discharge Stable Fri Aug 16, 2019  7:24 PM Peter Johnson discharge to home/self care              Follow-up Information     Follow up With Specialties Details Why Contact Info    Leatha Holden MD Orthopedic Surgery Schedule an appointment as soon as possible for a visit  If seeking follow-up care for sacral and coccyx fractures within the [address] [place] [id number]  [phone]            Discharge Medication List as of 8/16/2019  7:29 PM      CONTINUE these medications which have NOT CHANGED    Details   escitalopram (LEXAPRO) 10 mg tablet Take 10 mg by mouth daily at bedtime, Historical Med      !! oxyCODONE-acetaminophen (PERCOCET) 5-325 mg per tablet Take 1 tablet by mouth every 4 (four) hours as needed for moderate pain for up to 10 daysMax Daily Amount: 6 tablets, Starting Mon 8/12/2019, Until Thu 8/22/2019, Print      !! oxyCODONE-acetaminophen (PERCOCET) 5-325 mg per tablet Take 1 tablet by mouth every 6 (six) hours as needed for moderate painMax Daily Amount: 4 tablets, Starting Wed 8/14/2019, Print       !! - Potential duplicate medications found  Please discuss with provider  No discharge procedures on file      ED Provider  Electronically Signed by           Adriano Jon MD  08/16/19 3040

## 2021-06-15 DIAGNOSIS — Z20.828 EXPOSURE TO SARS-ASSOCIATED CORONAVIRUS: ICD-10-CM

## 2021-06-15 DIAGNOSIS — B34.9 VIRAL SYNDROME: ICD-10-CM

## 2021-06-15 PROCEDURE — U0005 INFEC AGEN DETEC AMPLI PROBE: HCPCS | Performed by: INTERNAL MEDICINE

## 2021-06-15 PROCEDURE — U0003 INFECTIOUS AGENT DETECTION BY NUCLEIC ACID (DNA OR RNA); SEVERE ACUTE RESPIRATORY SYNDROME CORONAVIRUS 2 (SARS-COV-2) (CORONAVIRUS DISEASE [COVID-19]), AMPLIFIED PROBE TECHNIQUE, MAKING USE OF HIGH THROUGHPUT TECHNOLOGIES AS DESCRIBED BY CMS-2020-01-R: HCPCS | Performed by: INTERNAL MEDICINE

## 2021-06-16 LAB — SARS-COV-2 RNA RESP QL NAA+PROBE: NEGATIVE

## 2021-06-17 ENCOUNTER — APPOINTMENT (EMERGENCY)
Dept: RADIOLOGY | Facility: HOSPITAL | Age: 56
End: 2021-06-17
Payer: COMMERCIAL

## 2021-06-17 ENCOUNTER — HOSPITAL ENCOUNTER (EMERGENCY)
Facility: HOSPITAL | Age: 56
Discharge: HOME | End: 2021-06-17
Attending: EMERGENCY MEDICINE
Payer: COMMERCIAL

## 2021-06-17 VITALS
HEART RATE: 90 BPM | SYSTOLIC BLOOD PRESSURE: 120 MMHG | OXYGEN SATURATION: 97 % | TEMPERATURE: 97.6 F | RESPIRATION RATE: 19 BRPM | DIASTOLIC BLOOD PRESSURE: 82 MMHG

## 2021-06-17 DIAGNOSIS — J40 BRONCHITIS: Primary | ICD-10-CM

## 2021-06-17 PROCEDURE — 94640 AIRWAY INHALATION TREATMENT: CPT

## 2021-06-17 PROCEDURE — 99284 EMERGENCY DEPT VISIT MOD MDM: CPT | Performed by: NURSE PRACTITIONER

## 2021-06-17 PROCEDURE — 71046 X-RAY EXAM CHEST 2 VIEWS: CPT

## 2021-06-17 PROCEDURE — 99283 EMERGENCY DEPT VISIT LOW MDM: CPT

## 2021-06-17 RX ORDER — AMOXICILLIN AND CLAVULANATE POTASSIUM 875; 125 MG/1; MG/1
1 TABLET, FILM COATED ORAL 2 TIMES DAILY
Qty: 20 TABLET | Refills: 0 | Status: SHIPPED | OUTPATIENT
Start: 2021-06-17 | End: 2021-06-27

## 2021-06-17 RX ORDER — PREDNISONE 20 MG/1
60 TABLET ORAL ONCE
Status: COMPLETED | OUTPATIENT
Start: 2021-06-17 | End: 2021-06-17

## 2021-06-17 RX ORDER — SODIUM CHLORIDE FOR INHALATION 0.9 %
3 VIAL, NEBULIZER (ML) INHALATION ONCE
Status: COMPLETED | OUTPATIENT
Start: 2021-06-17 | End: 2021-06-17

## 2021-06-17 RX ORDER — DEXTROMETHORPHAN HYDROBROMIDE AND PROMETHAZINE HYDROCHLORIDE 15; 6.25 MG/5ML; MG/5ML
5 SOLUTION ORAL 4 TIMES DAILY PRN
Qty: 118 ML | Refills: 0 | Status: SHIPPED | OUTPATIENT
Start: 2021-06-17 | End: 2021-06-22

## 2021-06-17 RX ORDER — ALBUTEROL SULFATE 90 UG/1
2 AEROSOL, METERED RESPIRATORY (INHALATION) EVERY 6 HOURS PRN
Qty: 6.7 G | Refills: 0 | Status: SHIPPED | OUTPATIENT
Start: 2021-06-17 | End: 2021-06-27

## 2021-06-17 RX ORDER — PREDNISONE 20 MG/1
60 TABLET ORAL DAILY
Qty: 15 TABLET | Refills: 0 | Status: SHIPPED | OUTPATIENT
Start: 2021-06-17 | End: 2021-06-22

## 2021-06-17 RX ORDER — IPRATROPIUM BROMIDE AND ALBUTEROL SULFATE 2.5; .5 MG/3ML; MG/3ML
3 SOLUTION RESPIRATORY (INHALATION) 4 TIMES DAILY
Qty: 60 ML | Refills: 0 | Status: SHIPPED | OUTPATIENT
Start: 2021-06-17 | End: 2021-06-22

## 2021-06-17 RX ORDER — IPRATROPIUM BROMIDE AND ALBUTEROL SULFATE 2.5; .5 MG/3ML; MG/3ML
3 SOLUTION RESPIRATORY (INHALATION) ONCE
Status: COMPLETED | OUTPATIENT
Start: 2021-06-17 | End: 2021-06-17

## 2021-06-17 RX ADMIN — IPRATROPIUM BROMIDE AND ALBUTEROL SULFATE 3 ML: 2.5; .5 SOLUTION RESPIRATORY (INHALATION) at 11:33

## 2021-06-17 RX ADMIN — HYDROCODONE BITARTRATE AND HOMATROPINE METHYLBROMIDE 5 ML: 5; 1.5 SYRUP ORAL at 11:33

## 2021-06-17 RX ADMIN — ISODIUM CHLORIDE 3 ML: 0.03 SOLUTION RESPIRATORY (INHALATION) at 11:33

## 2021-06-17 RX ADMIN — PREDNISONE 60 MG: 20 TABLET ORAL at 11:33

## 2021-06-17 NOTE — ED PROVIDER NOTES
History  Chief Complaint   Patient presents with    Cough     Pt presents to ED after 1 week of bronchitis symptoms with cough, runny nose, and chills  Pt states she is on abx, steroids, and cough supressants without relief      23-year-old female comes in here with a chief complaint of cough  She is coughing to the point where it is causing her chest soreness  Her cough is coarse wheezy and junky  She did a virtual visit was prescribed azithromycin and Tessalon Perles without any relief  Prior to Admission Medications   Prescriptions Last Dose Informant Patient Reported? Taking?   escitalopram (LEXAPRO) 10 mg tablet  Self Yes No   Sig: Take 10 mg by mouth daily at bedtime   oxyCODONE-acetaminophen (PERCOCET) 5-325 mg per tablet   No No   Sig: Take 1 tablet by mouth every 6 (six) hours as needed for moderate painMax Daily Amount: 4 tablets   Patient not taking: Reported on 8/16/2019      Facility-Administered Medications: None       History reviewed  No pertinent past medical history  Past Surgical History:   Procedure Laterality Date    FRACTURE SURGERY         History reviewed  No pertinent family history  I have reviewed and agree with the history as documented  E-Cigarette/Vaping     E-Cigarette/Vaping Substances     Social History     Tobacco Use    Smoking status: Never Smoker    Smokeless tobacco: Never Used   Substance Use Topics    Alcohol use: Not Currently    Drug use: Never       Review of Systems   Constitutional: Negative for diaphoresis, fatigue and fever  HENT: Negative for congestion, ear pain, nosebleeds and sore throat  Eyes: Negative for photophobia, pain, discharge and visual disturbance  Respiratory: Positive for cough and wheezing  Negative for choking, chest tightness and shortness of breath  Cardiovascular: Negative for chest pain and palpitations  Gastrointestinal: Negative for abdominal distention, abdominal pain, diarrhea and vomiting     Genitourinary: Negative for dysuria, flank pain and frequency  Musculoskeletal: Negative for back pain, gait problem and joint swelling  Skin: Negative for color change and rash  Neurological: Negative for dizziness, syncope and headaches  Psychiatric/Behavioral: Negative for behavioral problems and confusion  The patient is not nervous/anxious  All other systems reviewed and are negative  Physical Exam  Physical Exam  Vitals and nursing note reviewed  Constitutional:       General: She is not in acute distress  Appearance: She is well-developed  She is not ill-appearing or toxic-appearing  HENT:      Head: Normocephalic and atraumatic  Mouth/Throat:      Dentition: Normal dentition  Eyes:      General:         Right eye: No discharge  Left eye: No discharge  Cardiovascular:      Rate and Rhythm: Normal rate and regular rhythm  Pulmonary:      Effort: Pulmonary effort is normal  No accessory muscle usage or respiratory distress  Breath sounds: Wheezing present  No rhonchi  Abdominal:      General: There is no distension  Tenderness: There is no guarding  Musculoskeletal:         General: Normal range of motion  Cervical back: Normal range of motion and neck supple  Skin:     General: Skin is warm and dry  Neurological:      Mental Status: She is alert and oriented to person, place, and time  Coordination: Coordination normal    Psychiatric:         Behavior: Behavior is cooperative           Vital Signs  ED Triage Vitals [06/17/21 1028]   Temperature Pulse Respirations Blood Pressure SpO2   97 6 °F (36 4 °C) 90 19 120/82 97 %      Temp src Heart Rate Source Patient Position - Orthostatic VS BP Location FiO2 (%)   -- Monitor Sitting Left arm --      Pain Score       6           Vitals:    06/17/21 1028   BP: 120/82   Pulse: 90   Patient Position - Orthostatic VS: Sitting         Visual Acuity      ED Medications  Medications   HYDROcodone-homatropine (HYCODAN) oral syrup 5 mL (5 mL Oral Given 6/17/21 1133)   ipratropium-albuterol (DUO-NEB) 0 5-2 5 mg/3 mL inhalation solution 3 mL (3 mL Nebulization Given 6/17/21 1133)   sodium chloride 0 9 % inhalation solution 3 mL (3 mL Nebulization Given 6/17/21 1133)   predniSONE tablet 60 mg (60 mg Oral Given 6/17/21 1133)       Diagnostic Studies  Results Reviewed     None                 XR chest 2 views    (Results Pending)              Procedures  Procedures         ED Course                                           MDM  Number of Diagnoses or Management Options  Bronchitis: new and requires workup  Patient Progress  Patient progress: improved      Disposition  Final diagnoses:   Bronchitis     Time reflects when diagnosis was documented in both MDM as applicable and the Disposition within this note     Time User Action Codes Description Comment    6/17/2021 11:57 AM [address] Bronchitis       ED Disposition     ED Disposition Condition Date/Time Comment    Discharge Stable Thu Jun 17, 2021 11:57 AM Carlos Alberto Chicas discharge to home/self care  Follow-up Information     Follow up With Specialties Details Why Papito Bran MD Internal Medicine Schedule an appointment as soon as possible for a visit  As needed, For [address]  [address]            Patient's Medications   Discharge Prescriptions    ALBUTEROL (PROVENTIL HFA,VENTOLIN HFA) 90 MCG/ACT INHALER    Inhale 2 puffs every 6 (six) hours as needed for wheezing for up to 10 days       Start Date: 6/17/2021 End Date: 6/27/2021       Order Dose: 2 puffs       Quantity: 6 7 g    Refills: 0    AMOXICILLIN-CLAVULANATE (AUGMENTIN) 875-125 MG PER TABLET    Take 1 tablet by mouth 2 (two) times a day for 10 days       Start Date: 6/17/2021 End Date: 6/27/2021       Order Dose: 1 tablet       Quantity: 20 tablet    Refills: 0    IPRATROPIUM-ALBUTEROL (DUO-NEB) 0 5-2 5 MG/3 ML NEBULIZER SOLUTION    Take 3 mL by nebulization 4 (four) times a day for 5 days       Start Date: 6/17/2021 End Date: 6/22/2021       Order Dose: 3 mL       Quantity: 60 mL    Refills: 0    PREDNISONE 20 MG TABLET    Take 3 tablets (60 mg total) by mouth daily for 5 days       Start Date: 6/17/2021 End Date: 6/22/2021       Order Dose: 60 mg       Quantity: 15 tablet    Refills: 0    PROMETHAZINE-DM (PHENERGAN-DM) 6 25-15 MG/5 ML ORAL SYRUP    Take 5 mL by mouth 4 (four) times a day as needed for cough for up to 5 days       Start Date: 6/17/2021 End Date: 6/22/2021       Order Dose: 5 mL       Quantity: 118 mL    Refills: 0     Outpatient Discharge Orders   Nebulizer       PDMP Review     None          ED Provider  Electronically Signed by           MARK Rodrigues  06/17/21 5788

## 2021-06-17 NOTE — Clinical Note
Alex Osunanox was seen and treated in our emergency department on 6/17/2021  Diagnosis:     Loretta Painting  may return to work on return date  She may return on this date: 06/21/2021         If you have any questions or concerns, please don't hesitate to call        MARK Cunningham    ______________________________           _______________          _______________  Hospital Representative                              Date                                Time 0

## 2024-07-17 ENCOUNTER — APPOINTMENT (OUTPATIENT)
Dept: URGENT CARE | Facility: MEDICAL CENTER | Age: 59
End: 2024-07-17
Payer: OTHER MISCELLANEOUS

## 2024-07-17 PROCEDURE — G0382 LEV 3 HOSP TYPE B ED VISIT: HCPCS

## 2024-07-17 PROCEDURE — 99283 EMERGENCY DEPT VISIT LOW MDM: CPT

## 2024-07-17 PROCEDURE — 90715 TDAP VACCINE 7 YRS/> IM: CPT

## 2024-07-17 PROCEDURE — 90471 IMMUNIZATION ADMIN: CPT

## 2024-07-18 ENCOUNTER — APPOINTMENT (OUTPATIENT)
Dept: URGENT CARE | Facility: MEDICAL CENTER | Age: 59
End: 2024-07-18
Payer: OTHER MISCELLANEOUS

## 2024-07-18 PROCEDURE — 99213 OFFICE O/P EST LOW 20 MIN: CPT | Performed by: PHYSICIAN ASSISTANT
